# Patient Record
Sex: MALE | Race: WHITE | NOT HISPANIC OR LATINO | Employment: FULL TIME | URBAN - METROPOLITAN AREA
[De-identification: names, ages, dates, MRNs, and addresses within clinical notes are randomized per-mention and may not be internally consistent; named-entity substitution may affect disease eponyms.]

---

## 2019-06-08 ENCOUNTER — OFFICE VISIT (OUTPATIENT)
Dept: RETAIL CLINIC | Facility: CLINIC | Age: 39
End: 2019-06-08

## 2019-06-08 VITALS
OXYGEN SATURATION: 98 % | RESPIRATION RATE: 16 BRPM | DIASTOLIC BLOOD PRESSURE: 78 MMHG | TEMPERATURE: 99.2 F | SYSTOLIC BLOOD PRESSURE: 110 MMHG | HEART RATE: 62 BPM

## 2019-06-08 DIAGNOSIS — A69.20 LYME DISEASE: Primary | ICD-10-CM

## 2019-06-08 PROCEDURE — 99203 OFFICE O/P NEW LOW 30 MIN: CPT | Performed by: NURSE PRACTITIONER

## 2019-06-08 RX ORDER — PREDNISONE 10 MG/1
10 TABLET ORAL DAILY
Qty: 20 TABLET | Refills: 0 | Status: SHIPPED | OUTPATIENT
Start: 2019-06-08

## 2019-06-08 RX ORDER — DOXYCYCLINE HYCLATE 100 MG/1
100 CAPSULE ORAL 2 TIMES DAILY
Qty: 28 CAPSULE | Refills: 0 | Status: SHIPPED | OUTPATIENT
Start: 2019-06-08 | End: 2019-06-22

## 2019-06-08 NOTE — PROGRESS NOTES
[facility]    CC:   Chief Complaint   Patient presents with   • Rash     HPI   38 YOM presents c/o general bodyaches/malaise/fatigue since 2/2019 when thought he was bitten by tick.  After this, left knee swelled overnight, no pain, but +stiffness, x 1 month, not hot, went away. Ultrasound in Feb showed fluid accumulation. He continued to wrap/ice, then gone without return.  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.  For 2 weeks he notices a round raised rash right scapular area that is pruritic, using clotrimazole without effect, as he thought it was ringworm.   Since then, rash has spread to trunk/waistline but none on arms/legs/feet/hands  Notes he did travel 2 weeks ago with his wife, but she has no rashes.   He has been caring for stray cats and feels maybe rash is related to this  [place]  Due to myalgias/arthralgias/circular rash on back, he was concerned about lyme disease  He notes some areas are pruritic, some are not  He denies f/c/cough/soa/chest pain/n/v/d/abdominal pain or other new concerns.  He denies new exposures to medications/lotions/soaps  He has not taken any antibiotics recently  He has not tried any other treatments otc.    Review of Systems: Please see the above history of present illness for pertinent positives and negatives. The remainder of the patient's systems have been reviewed and are negative.     History reviewed. No pertinent past medical history.    Past Surgical History:   Procedure Laterality Date   • HUMERUS SURGERY     • PLEURAL SCARIFICATION     • SHOULDER ARTHROSCOPY         Social History     Tobacco Use   • Smoking status: Never Smoker   Substance Use Topics   • Alcohol use: Yes     Comment: daily when working   • Drug use: Defer       Current Outpatient Medications: none    Allergies   Allergen Reactions   • Meperidine Hives        OBJECTIVE:    /78   Pulse 62   Temp 99.2 °F (37.3 °C) (Oral)   Resp 16   SpO2 98%     Lab Results (last 24 hours)     ** No results found for the last 24 hours. **          General Appearance:    Alert, cooperative, no distress, appears stated age   Head:    Normocephalic, without obvious abnormality, atraumatic   Eyes:    PERRL, conjunctiva/corneas clear, EOM's intact, fundi     benign, both eyes   Ears:    Normal TM's and external ear canals, both ears   Nose:   Nares normal, septum midline, mucosa normal, no drainage     or sinus tenderness   Throat:   Lips, mucosa, and tongue normal; teeth and gums normal  Tonsils without erythema or exudates.   Neck:   Supple, symmetrical, trachea midline, no adenopathy;        Skin:     Single circular, pale red, slightly raised area approximately 1.5 cm x 3 cm with central pallor. Generalized raised/red rash over waist/flanks without purulent drainage or open areas   Lungs:     Clear to auscultation bilaterally, respirations unlabored   Chest Wall:    No tenderness or deformity    Heart:    Regular rate and rhythm, S1 and S2 normal, no murmur, rub    or gallop                                       ASSESSMENT/PLAN    1. Lyme disease  Treating empirically with doxycycline (VIBRAMYCIN) 100 MG capsule; Take 1 capsule by mouth 2 (Two) Times a Day for 14 days.  Dispense: 28 capsule; Refill: 0    Added predniSONE (DELTASONE) 10 MG tablet; Take 1 tablet by mouth Daily.  Dispense: 20 tablet; Refill: 0 as rest of rash appears more allergic and is pruritic    D/W patient that he should go to PCP for formal testing for lyme/ehlichia etc if no improvement  D/W possible ringworm or other fungal rash, may need further evaluation and to go to PCP for this. Patient verbalized understanding      Juve Ramos had no medications administered during this visit.      AVS and information sheet given to patient, discussed in detail, questions answered